# Patient Record
Sex: FEMALE | Race: WHITE | Employment: OTHER | URBAN - METROPOLITAN AREA
[De-identification: names, ages, dates, MRNs, and addresses within clinical notes are randomized per-mention and may not be internally consistent; named-entity substitution may affect disease eponyms.]

---

## 2017-03-13 ENCOUNTER — APPOINTMENT (OUTPATIENT)
Dept: CV DIAGNOSTICS | Facility: HOSPITAL | Age: 82
DRG: 291 | End: 2017-03-13
Attending: INTERNAL MEDICINE
Payer: MEDICARE

## 2017-03-13 ENCOUNTER — APPOINTMENT (OUTPATIENT)
Dept: CT IMAGING | Facility: HOSPITAL | Age: 82
DRG: 291 | End: 2017-03-13
Attending: EMERGENCY MEDICINE
Payer: MEDICARE

## 2017-03-13 ENCOUNTER — APPOINTMENT (OUTPATIENT)
Dept: ULTRASOUND IMAGING | Facility: HOSPITAL | Age: 82
DRG: 291 | End: 2017-03-13
Attending: HOSPITALIST
Payer: MEDICARE

## 2017-03-13 ENCOUNTER — APPOINTMENT (OUTPATIENT)
Dept: CV DIAGNOSTICS | Facility: HOSPITAL | Age: 82
DRG: 291 | End: 2017-03-13
Attending: HOSPITALIST
Payer: MEDICARE

## 2017-03-13 ENCOUNTER — HOSPITAL ENCOUNTER (INPATIENT)
Facility: HOSPITAL | Age: 82
LOS: 8 days | Discharge: NURSING, REHAB OR LONG-TERM CARE | DRG: 291 | End: 2017-03-21
Attending: EMERGENCY MEDICINE | Admitting: HOSPITALIST
Payer: MEDICARE

## 2017-03-13 ENCOUNTER — APPOINTMENT (OUTPATIENT)
Dept: GENERAL RADIOLOGY | Facility: HOSPITAL | Age: 82
DRG: 291 | End: 2017-03-13
Attending: EMERGENCY MEDICINE
Payer: MEDICARE

## 2017-03-13 DIAGNOSIS — I26.99 OTHER ACUTE PULMONARY EMBOLISM WITHOUT ACUTE COR PULMONALE (HCC): Primary | ICD-10-CM

## 2017-03-13 LAB
ALBUMIN SERPL BCP-MCNC: 2.8 G/DL (ref 3.5–4.8)
ALP SERPL-CCNC: 72 U/L (ref 32–100)
ALT SERPL-CCNC: 18 U/L (ref 14–54)
ANION GAP SERPL CALC-SCNC: 8 MMOL/L (ref 0–18)
APTT PPP: 34.9 SECONDS (ref 23.2–35.3)
APTT PPP: >240 SECONDS (ref 23.2–35.3)
AST SERPL-CCNC: 24 U/L (ref 15–41)
BASOPHILS # BLD: 0 K/UL (ref 0–0.2)
BASOPHILS NFR BLD: 0 %
BILIRUB DIRECT SERPL-MCNC: 0.2 MG/DL (ref 0–0.2)
BILIRUB SERPL-MCNC: 0.7 MG/DL (ref 0.3–1.2)
BILIRUB UR QL: NEGATIVE
BNP SERPL-MCNC: 767 PG/ML (ref 0–100)
BUN SERPL-MCNC: 11 MG/DL (ref 8–20)
BUN/CREAT SERPL: 18 (ref 10–20)
CALCIUM SERPL-MCNC: 8.5 MG/DL (ref 8.5–10.5)
CHLORIDE SERPL-SCNC: 100 MMOL/L (ref 95–110)
CO2 SERPL-SCNC: 25 MMOL/L (ref 22–32)
COLOR UR: YELLOW
CREAT SERPL-MCNC: 0.61 MG/DL (ref 0.5–1.5)
D DIMER PPP FEU-MCNC: 3.74 MCG/ML (ref ?–0.88)
EOSINOPHIL # BLD: 0.1 K/UL (ref 0–0.7)
EOSINOPHIL NFR BLD: 1 %
ERYTHROCYTE [DISTWIDTH] IN BLOOD BY AUTOMATED COUNT: 14.7 % (ref 11–15)
ERYTHROCYTE [DISTWIDTH] IN BLOOD BY AUTOMATED COUNT: 14.8 % (ref 11–15)
GLUCOSE SERPL-MCNC: 115 MG/DL (ref 70–99)
GLUCOSE UR-MCNC: NEGATIVE MG/DL
HCT VFR BLD AUTO: 41.4 % (ref 35–48)
HCT VFR BLD AUTO: 42 % (ref 35–48)
HGB BLD-MCNC: 13.9 G/DL (ref 12–16)
HGB BLD-MCNC: 13.9 G/DL (ref 12–16)
HGB UR QL STRIP.AUTO: NEGATIVE
LEUKOCYTE ESTERASE UR QL STRIP.AUTO: NEGATIVE
LYMPHOCYTES # BLD: 1.3 K/UL (ref 1–4)
LYMPHOCYTES NFR BLD: 11 %
MCH RBC QN AUTO: 28.7 PG (ref 27–32)
MCH RBC QN AUTO: 28.8 PG (ref 27–32)
MCHC RBC AUTO-ENTMCNC: 33.1 G/DL (ref 32–37)
MCHC RBC AUTO-ENTMCNC: 33.5 G/DL (ref 32–37)
MCV RBC AUTO: 85.9 FL (ref 80–100)
MCV RBC AUTO: 86.6 FL (ref 80–100)
MONOCYTES # BLD: 0.6 K/UL (ref 0–1)
MONOCYTES NFR BLD: 6 %
NEUTROPHILS # BLD AUTO: 9.4 K/UL (ref 1.8–7.7)
NEUTROPHILS NFR BLD: 83 %
NITRITE UR QL STRIP.AUTO: NEGATIVE
OSMOLALITY UR CALC.SUM OF ELEC: 276 MOSM/KG (ref 275–295)
PH UR: 5 [PH] (ref 5–8)
PLATELET # BLD AUTO: 313 K/UL (ref 140–400)
PLATELET # BLD AUTO: 333 K/UL (ref 140–400)
PMV BLD AUTO: 7.3 FL (ref 7.4–10.3)
PMV BLD AUTO: 7.7 FL (ref 7.4–10.3)
POTASSIUM SERPL-SCNC: 4.3 MMOL/L (ref 3.3–5.1)
PROT SERPL-MCNC: 7.5 G/DL (ref 5.9–8.4)
PROT UR-MCNC: 30 MG/DL
RBC # BLD AUTO: 4.82 M/UL (ref 3.7–5.4)
RBC # BLD AUTO: 4.85 M/UL (ref 3.7–5.4)
RBC #/AREA URNS AUTO: 15 /HPF
SODIUM SERPL-SCNC: 133 MMOL/L (ref 136–144)
SP GR UR STRIP: 1.02 (ref 1–1.03)
TROPONIN I SERPL-MCNC: 0.02 NG/ML (ref ?–0.03)
TROPONIN I SERPL-MCNC: 0.03 NG/ML (ref ?–0.03)
UROBILINOGEN UR STRIP-ACNC: <2
VIT C UR-MCNC: 40 MG/DL
WBC # BLD AUTO: 11.4 K/UL (ref 4–11)
WBC # BLD AUTO: 9.4 K/UL (ref 4–11)
WBC #/AREA URNS AUTO: 3 /HPF

## 2017-03-13 PROCEDURE — 93970 EXTREMITY STUDY: CPT

## 2017-03-13 PROCEDURE — 71010 XR CHEST AP PORTABLE  (CPT=71010): CPT | Performed by: RADIOLOGY

## 2017-03-13 PROCEDURE — 99223 1ST HOSP IP/OBS HIGH 75: CPT | Performed by: HOSPITALIST

## 2017-03-13 PROCEDURE — 99291 CRITICAL CARE FIRST HOUR: CPT | Performed by: INTERNAL MEDICINE

## 2017-03-13 PROCEDURE — 71260 CT THORAX DX C+: CPT

## 2017-03-13 PROCEDURE — 93306 TTE W/DOPPLER COMPLETE: CPT | Performed by: INTERNAL MEDICINE

## 2017-03-13 PROCEDURE — 93306 TTE W/DOPPLER COMPLETE: CPT

## 2017-03-13 RX ORDER — BISACODYL 10 MG
10 SUPPOSITORY, RECTAL RECTAL
Status: DISCONTINUED | OUTPATIENT
Start: 2017-03-13 | End: 2017-03-21

## 2017-03-13 RX ORDER — DOXEPIN HYDROCHLORIDE 50 MG/1
1 CAPSULE ORAL DAILY
Status: DISCONTINUED | OUTPATIENT
Start: 2017-03-13 | End: 2017-03-17

## 2017-03-13 RX ORDER — ASCORBIC ACID 500 MG
500 TABLET ORAL 2 TIMES DAILY
COMMUNITY

## 2017-03-13 RX ORDER — ECHINACEA PURPUREA EXTRACT 125 MG
1 TABLET ORAL
Status: DISCONTINUED | OUTPATIENT
Start: 2017-03-13 | End: 2017-03-21

## 2017-03-13 RX ORDER — ACETAMINOPHEN 325 MG/1
650 TABLET ORAL EVERY 6 HOURS PRN
COMMUNITY

## 2017-03-13 RX ORDER — HEPARIN SODIUM AND DEXTROSE 10000; 5 [USP'U]/100ML; G/100ML
INJECTION INTRAVENOUS CONTINUOUS
Status: DISCONTINUED | OUTPATIENT
Start: 2017-03-13 | End: 2017-03-18

## 2017-03-13 RX ORDER — HEPARIN SODIUM 1000 [USP'U]/ML
80 INJECTION, SOLUTION INTRAVENOUS; SUBCUTANEOUS ONCE
Status: DISCONTINUED | OUTPATIENT
Start: 2017-03-13 | End: 2017-03-13

## 2017-03-13 RX ORDER — FUROSEMIDE 10 MG/ML
40 INJECTION INTRAMUSCULAR; INTRAVENOUS ONCE
Status: COMPLETED | OUTPATIENT
Start: 2017-03-13 | End: 2017-03-13

## 2017-03-13 RX ORDER — ACETAMINOPHEN 325 MG/1
650 TABLET ORAL EVERY 6 HOURS PRN
Status: DISCONTINUED | OUTPATIENT
Start: 2017-03-13 | End: 2017-03-21

## 2017-03-13 RX ORDER — ECHINACEA PURPUREA EXTRACT 125 MG
1 TABLET ORAL AS NEEDED
COMMUNITY

## 2017-03-13 RX ORDER — DOCUSATE SODIUM 100 MG/1
100 CAPSULE, LIQUID FILLED ORAL 2 TIMES DAILY
Status: DISCONTINUED | OUTPATIENT
Start: 2017-03-13 | End: 2017-03-21

## 2017-03-13 RX ORDER — ONDANSETRON 2 MG/ML
4 INJECTION INTRAMUSCULAR; INTRAVENOUS EVERY 6 HOURS PRN
Status: DISCONTINUED | OUTPATIENT
Start: 2017-03-13 | End: 2017-03-21

## 2017-03-13 RX ORDER — ASCORBIC ACID 500 MG
500 TABLET ORAL 2 TIMES DAILY
Status: DISCONTINUED | OUTPATIENT
Start: 2017-03-13 | End: 2017-03-17

## 2017-03-13 RX ORDER — HEPARIN SODIUM 1000 [USP'U]/ML
80 INJECTION, SOLUTION INTRAVENOUS; SUBCUTANEOUS ONCE
Status: COMPLETED | OUTPATIENT
Start: 2017-03-13 | End: 2017-03-13

## 2017-03-13 RX ORDER — HEPARIN SODIUM AND DEXTROSE 10000; 5 [USP'U]/100ML; G/100ML
18 INJECTION INTRAVENOUS ONCE
Status: COMPLETED | OUTPATIENT
Start: 2017-03-13 | End: 2017-03-13

## 2017-03-13 RX ORDER — SODIUM PHOSPHATE, DIBASIC AND SODIUM PHOSPHATE, MONOBASIC 7; 19 G/133ML; G/133ML
1 ENEMA RECTAL ONCE AS NEEDED
Status: ACTIVE | OUTPATIENT
Start: 2017-03-13 | End: 2017-03-13

## 2017-03-13 RX ORDER — ACETAMINOPHEN 325 MG/1
650 TABLET ORAL EVERY 6 HOURS PRN
Status: DISCONTINUED | OUTPATIENT
Start: 2017-03-13 | End: 2017-03-13

## 2017-03-13 RX ORDER — AMOXICILLIN AND CLAVULANATE POTASSIUM 500; 125 MG/1; MG/1
500 TABLET, FILM COATED ORAL EVERY 12 HOURS SCHEDULED
Status: DISCONTINUED | OUTPATIENT
Start: 2017-03-13 | End: 2017-03-15

## 2017-03-13 RX ORDER — AMOXICILLIN AND CLAVULANATE POTASSIUM 500; 125 MG/1; MG/1
1 TABLET, FILM COATED ORAL 3 TIMES DAILY
Status: ON HOLD | COMMUNITY
End: 2017-03-20

## 2017-03-13 RX ORDER — POLYETHYLENE GLYCOL 3350 17 G/17G
17 POWDER, FOR SOLUTION ORAL DAILY PRN
Status: DISCONTINUED | OUTPATIENT
Start: 2017-03-13 | End: 2017-03-21

## 2017-03-13 NOTE — CONSULTS
[facility]    PATIENT'S NAME: Davila [facility]   ATTENDING PHYSICIAN: Danish Ricardo MD   CONSULTING PHYSICIAN: Amelie Mchugh.  Britton Renner MD   PATIENT ACCOUNT#:   [id number]    LOCATION:  [facility]  MEDICAL RECORD #:   [id number]       DATE OF BIRTH: process edema, small bilateral posterior layer pleural effusions. The patient was started on IV heparin. She has received IV Lasix and Pulmonary consultation.   It was reported that a large thrombus involving the right main pulmonary artery extending to th patient was noted to have edema which is stable. She also has a history of rhabdomyolysis. No cardiac history reported. No hypertension reported. No hyperlipidemia reported.       PHYSICAL EXAMINATION:    GENERAL:  Awake, alert, breathing comfortably on

## 2017-03-13 NOTE — ED PROVIDER NOTES
Patient Seen in: [facility] AND [facility] Emergency Department    History   Patient presents with:  Dyspnea ANAND SOB (respiratory)    Stated Complaint: hypoxia    HPI    80-year-old female with history of hypothyroidism, osteoporosis, and edema presents from her bilaterally. Cardiovascular: regular rate and rhythm  Gastrointestinal:  abdomen is soft and non tender, no masses, bowel sounds normal  Neurological: Speech normal.  Moving extremities equally ×4. Skin: warm and dry, no rashes.   Musculoskeletal: neck is CBC WITH DIFFERENTIAL WITH PLATELET.   Procedure                               Abnormality         Status                     ---------                               -----------         ------                     CBC W/ DIFFERENTIAL[[id number]]          Abno

## 2017-03-13 NOTE — H&P
[address] Patient Status:  Inpatient    1928 MRN [id number]   Location [facility] 1SW Attending Kimber Davis MD   Hosp Day # 0 PCP Reynaldo Brain     Date:  3/13/20 1 tablet by mouth 3 (three) times daily. Until 3/23/17   Vitamin C 500 MG Oral Tab Take 500 mg by mouth 2 (two) times daily. acetaminophen 325 MG Oral Tab Take 650 mg by mouth every 6 (six) hours as needed for Pain or Fever.    Saline Nasal Spray 0.65 % N cardiomegaly mild vascular congestion suggesting heart failure or fluid overload. 2. Patchy areas of increased density mostly in the left mid and lower lung could be superimposed pulmonary edema or pneumonia. 3. Possible small left effusion.          Ct Naina patient's hemodynamic deteriorate or significant hypoxemia and then risk versus benefit will offer thrombolytic therapy      40 minute critical care time  We will follow the patient closely        Buzz Wallis MD  3/13/2017

## 2017-03-13 NOTE — ED INITIAL ASSESSMENT (HPI)
Via medics from [address] [place] [address],[address] home---was found hypoxic this morning----80's on Room air. Patient with cough, denies pain or sob.

## 2017-03-14 ENCOUNTER — APPOINTMENT (OUTPATIENT)
Dept: GENERAL RADIOLOGY | Facility: HOSPITAL | Age: 82
DRG: 291 | End: 2017-03-14
Attending: INTERNAL MEDICINE
Payer: MEDICARE

## 2017-03-14 LAB
ANION GAP SERPL CALC-SCNC: 9 MMOL/L (ref 0–18)
APTT PPP: 139.7 SECONDS (ref 23.2–35.3)
APTT PPP: 204.6 SECONDS (ref 23.2–35.3)
BASOPHILS # BLD: 0 K/UL (ref 0–0.2)
BASOPHILS NFR BLD: 0 %
BUN SERPL-MCNC: 16 MG/DL (ref 8–20)
BUN/CREAT SERPL: 22.9 (ref 10–20)
CALCIUM SERPL-MCNC: 8.1 MG/DL (ref 8.5–10.5)
CHLORIDE SERPL-SCNC: 101 MMOL/L (ref 95–110)
CO2 SERPL-SCNC: 26 MMOL/L (ref 22–32)
CREAT SERPL-MCNC: 0.7 MG/DL (ref 0.5–1.5)
EOSINOPHIL # BLD: 0.2 K/UL (ref 0–0.7)
EOSINOPHIL NFR BLD: 2 %
ERYTHROCYTE [DISTWIDTH] IN BLOOD BY AUTOMATED COUNT: 14.8 % (ref 11–15)
GLUCOSE SERPL-MCNC: 98 MG/DL (ref 70–99)
HCT VFR BLD AUTO: 36.1 % (ref 35–48)
HGB BLD-MCNC: 12.1 G/DL (ref 12–16)
INR BLD: 1.4 (ref 0.9–1.2)
LYMPHOCYTES # BLD: 1.3 K/UL (ref 1–4)
LYMPHOCYTES NFR BLD: 14 %
MAGNESIUM SERPL-MCNC: 2.1 MG/DL (ref 1.8–2.5)
MCH RBC QN AUTO: 28.6 PG (ref 27–32)
MCHC RBC AUTO-ENTMCNC: 33.5 G/DL (ref 32–37)
MCV RBC AUTO: 85.6 FL (ref 80–100)
MONOCYTES # BLD: 0.7 K/UL (ref 0–1)
MONOCYTES NFR BLD: 7 %
MRSA DNA SPEC QL NAA+PROBE: NEGATIVE
NEUTROPHILS # BLD AUTO: 7.2 K/UL (ref 1.8–7.7)
NEUTROPHILS NFR BLD: 77 %
OSMOLALITY UR CALC.SUM OF ELEC: 283 MOSM/KG (ref 275–295)
PLATELET # BLD AUTO: 295 K/UL (ref 140–400)
PLATELET # BLD AUTO: 295 K/UL (ref 140–400)
PMV BLD AUTO: 7.5 FL (ref 7.4–10.3)
POTASSIUM SERPL-SCNC: 3.7 MMOL/L (ref 3.3–5.1)
PROTHROMBIN TIME: 17 SECONDS (ref 11.8–14.5)
RBC # BLD AUTO: 4.22 M/UL (ref 3.7–5.4)
SODIUM SERPL-SCNC: 136 MMOL/L (ref 136–144)
T4 FREE SERPL-MCNC: 0.81 NG/DL (ref 0.58–1.64)
TSH SERPL-ACNC: 11.84 UIU/ML (ref 0.34–5.6)
WBC # BLD AUTO: 9.3 K/UL (ref 4–11)

## 2017-03-14 PROCEDURE — 99233 SBSQ HOSP IP/OBS HIGH 50: CPT | Performed by: HOSPITALIST

## 2017-03-14 PROCEDURE — 71010 XR CHEST AP PORTABLE  (CPT=71010): CPT

## 2017-03-14 PROCEDURE — 99233 SBSQ HOSP IP/OBS HIGH 50: CPT | Performed by: INTERNAL MEDICINE

## 2017-03-14 RX ORDER — SODIUM CHLORIDE 9 MG/ML
INJECTION, SOLUTION INTRAVENOUS
Status: COMPLETED
Start: 2017-03-14 | End: 2017-03-14

## 2017-03-14 RX ORDER — POTASSIUM CHLORIDE 20 MEQ/1
40 TABLET, EXTENDED RELEASE ORAL ONCE
Status: COMPLETED | OUTPATIENT
Start: 2017-03-14 | End: 2017-03-14

## 2017-03-14 RX ORDER — SODIUM CHLORIDE 9 MG/ML
INJECTION, SOLUTION INTRAVENOUS ONCE
Status: COMPLETED | OUTPATIENT
Start: 2017-03-14 | End: 2017-03-14

## 2017-03-14 RX ORDER — WARFARIN SODIUM 3 MG/1
3 TABLET ORAL NIGHTLY
Status: DISCONTINUED | OUTPATIENT
Start: 2017-03-14 | End: 2017-03-16

## 2017-03-14 RX ORDER — MELATONIN
DAILY
Status: DISCONTINUED | OUTPATIENT
Start: 2017-03-14 | End: 2017-03-21

## 2017-03-14 RX ORDER — LEVOTHYROXINE SODIUM 0.03 MG/1
25 TABLET ORAL
Status: DISCONTINUED | OUTPATIENT
Start: 2017-03-14 | End: 2017-03-21

## 2017-03-14 NOTE — PLAN OF CARE
CARDIOVASCULAR - ADULT    • Maintains optimal cardiac output and hemodynamic stability Progressing    • Absence of cardiac arrhythmias or at baseline Progressing    BP low overnight, fluid bolus provided with improvement of SBP greater than 90.  Monitor NSR

## 2017-03-14 NOTE — PLAN OF CARE
Inpatient to Inpatient Transfer    Reason for Transfer:  Transfer to 228 (PCU)     SBAR Reviewed and Verbal Report:  Given to Cira Jeter RN    Patient's Family Notified of Transfer and Given Unit Phone Number/Visiting Hours:  Yes    Comments: Transfer to PCU

## 2017-03-14 NOTE — PLAN OF CARE
CARDIOVASCULAR - ADULT    • Maintains optimal cardiac output and hemodynamic stability Progressing    • Absence of cardiac arrhythmias or at baseline Progressing        GENITOURINARY - ADULT    • Absence of urinary retention Progressing        METABOLIC/FL

## 2017-03-14 NOTE — PROGRESS NOTES
[facility]  Cardiology Progress Note    Malcolm Manju Patient Status:  Inpatient    1928 MRN [id number]   Location [facility] 2W/SW Attending Kimber Davis MD   Hosp Day # 1 PCP [facility]       Assessment and Pl 03/13/17   0927  03/14/17   0358   NA  133*  136   K  4.3  3.7   CL  100  101   CO2  25  26   BUN  11  16   CREATSERUM  0.61  0.70   CA  8.5  8.1*   MG   --   2.1   GLU  115*  98       Recent Labs   Lab  03/13/17   0927   ALT  18   AST  24   ALB  2.8*

## 2017-03-14 NOTE — DISCHARGE PLANNING
SW following up on d/c planning for the patient. She was admitted from [facility] where she is a long term resident. Updates sent today and SW will arrange transfer back when she is stable for d/c.      Cortez Bautista LCSW  [id number]

## 2017-03-14 NOTE — SPIRITUAL CARE NOTE
Chp responded to request for prayer. Pt's brother Samantha Reyes) was at her side. They both confirmed that communion was offered earlier today. Chp introduced them to spiritual care. Pt and brother welcome regular pastoral visits.

## 2017-03-14 NOTE — PROGRESS NOTES
[facility]    Progress Note    Kyara Dang Patient Status:  Inpatient    1928 MRN [id number]   Location [facility] 2W/SW Attending Divina Morelos MD   Hosp Day # 1 PCP [facility]     Subjective:   S 03/14/2017   CREATSERUM 0.70 03/14/2017   BUN 16 03/14/2017    03/14/2017   K 3.7 03/14/2017    03/14/2017   CO2 26 03/14/2017   GLU 98 03/14/2017   CA 8.1* 03/14/2017   ALB 2.8* 03/13/2017   ALKPHO 72 03/13/2017   BILT 0.7 03/13/2017   TP 7.5

## 2017-03-14 NOTE — H&P
Cirilo Richard    PATIENT'S NAME: Desmond Candelario   ATTENDING PHYSICIAN: Neli Deshpande MD   PATIENT ACCOUNT#:   [de-identified]    LOCATION:  [facility]  MEDICAL RECORD #:   [id number]       YOB: 1928  ADMISSION DATE:       03 neck pain. No abdominal pain. No nausea or vomiting. She says that her bowel movements are regular.   She does have urinary incontinence, chronic lower extremity edema, generalized weakness with a fall about 1 year ago after which she ended up in the [facility] dilated. PA pressure 38 mmHg. Right ventricle moderately to severely dilated with decreased systolic function. Small pericardial effusion. ASSESSMENT AND PLAN:    1. Acute large PE in the right main pulmonary artery with right ventricular strain.

## 2017-03-15 LAB
ANION GAP SERPL CALC-SCNC: 6 MMOL/L (ref 0–18)
APTT PPP: 124.4 SECONDS (ref 23.2–35.3)
APTT PPP: 71.7 SECONDS (ref 23.2–35.3)
APTT PPP: 87 SECONDS (ref 23.2–35.3)
BUN SERPL-MCNC: 12 MG/DL (ref 8–20)
BUN/CREAT SERPL: 21.4 (ref 10–20)
CALCIUM SERPL-MCNC: 8.3 MG/DL (ref 8.5–10.5)
CHLORIDE SERPL-SCNC: 102 MMOL/L (ref 95–110)
CO2 SERPL-SCNC: 26 MMOL/L (ref 22–32)
CREAT SERPL-MCNC: 0.56 MG/DL (ref 0.5–1.5)
GLUCOSE SERPL-MCNC: 115 MG/DL (ref 70–99)
INR BLD: 1.2 (ref 0.9–1.2)
OSMOLALITY UR CALC.SUM OF ELEC: 279 MOSM/KG (ref 275–295)
PLATELET # BLD AUTO: 298 K/UL (ref 140–400)
POTASSIUM SERPL-SCNC: 4.3 MMOL/L (ref 3.3–5.1)
PROTHROMBIN TIME: 15.2 SECONDS (ref 11.8–14.5)
SODIUM SERPL-SCNC: 134 MMOL/L (ref 136–144)

## 2017-03-15 PROCEDURE — 99233 SBSQ HOSP IP/OBS HIGH 50: CPT | Performed by: HOSPITALIST

## 2017-03-15 PROCEDURE — 99233 SBSQ HOSP IP/OBS HIGH 50: CPT | Performed by: INTERNAL MEDICINE

## 2017-03-15 NOTE — PLAN OF CARE
CARDIOVASCULAR - ADULT    • Maintains optimal cardiac output and hemodynamic stability Progressing    • Absence of cardiac arrhythmias or at baseline Progressing          GENITOURINARY - ADULT    • Absence of urinary retention Progressing          METABOLI

## 2017-03-15 NOTE — PROGRESS NOTES
[facility]    Progress Note    Matt Ramirez Patient Status:  Inpatient    1928 MRN [id number]   Location [facility] 2W/SW Attending Chano Colin MD   Hosp Day # 2 PCP [facility]       Subjective:   Feeling gtt  Dispo pending    >35min spent, >50% spent counseling and coordinating care in the form of educating pt/family and d/w consultants and staff.

## 2017-03-15 NOTE — PLAN OF CARE
SKIN/TISSUE INTEGRITY - ADULT    • Incision(s), wounds(s) or drain site(s) healing without S/S of infection Not Progressing          CARDIOVASCULAR - ADULT    • Maintains optimal cardiac output and hemodynamic stability Progressing    • Absence of cardiac

## 2017-03-15 NOTE — PROGRESS NOTES
[facility]  Cardiology Progress Note    Camron Handyric Patient Status:  Inpatient    1928 MRN [id number]   Location [facility] 2W/SW Attending Marika Wall MD   Hosp Day # 2 PCP [facility]       Assessment and Plan:PE CA  8.5  8.1*  8.3*   MG   --   2.1   --    GLU  115*  98  115*       Recent Labs   Lab  03/13/17 0927   ALT  18   AST  24   ALB  2.8*       Recent Labs   Lab  03/13/17 0927 03/13/17   1743   TROP  0.03  0.02       Allergies:   No Known Allergies

## 2017-03-15 NOTE — PROGRESS NOTES
[facility]    Progress Note    Ute De Leon Patient Status:  Inpatient    1928 MRN [id number]   Location [facility] 2W/SW Attending Duc Lang MD   Hosp Day # 2 PCP [facility]     Subjective:   Subject AST 24 03/13/2017   ALT 18 03/13/2017   PTT 71.7* 03/14/2017   INR 1.2 03/15/2017   T4F 0.81 03/14/2017   TSH 11.84* 03/14/2017   DDIMER 3.74* 03/13/2017   MG 2.1 03/14/2017   TROP 0.02 03/13/2017       Us Venous Doppler Leg Bilat - Diag Img (cpt=[id number])

## 2017-03-15 NOTE — PHYSICAL THERAPY NOTE
PHYSICAL THERAPY EVALUATION - INPATIENT     Room Number: 228/228-A  Evaluation Date: 3/15/2017  Type of Evaluation: Initial  Physician Order: PT Eval and Treat    Presenting Problem: PE  Reason for Therapy: Mobility Dysfunction and Discharge Planning • Rhabdomyolysis    • Fall    • Osteoarthritis    • Disorder of thyroid    • Hyponatremia    • Edema        Past Surgical History  History reviewed. No pertinent past surgical history.     HOME SITUATION  Type of Home: Skilled nursing facility (ICQXVMIIO) 86.62%   Standardized Score (AM-PAC Scale): 28.58   CMS Modifier (G-Code): CM    FUNCTIONAL ABILITY STATUS  Gait Assessment   Gait Assistance: Not tested           Stoop/Curb Assistance: Not tested       Bed Mobility:max A x 2    Transfers:Not assessed

## 2017-03-15 NOTE — PROGRESS NOTES
[facility]    Progress Note    Katy Slider Patient Status:  Inpatient    1928 MRN [id number]   Location [facility] 2W/SW Attending Ailin Blankenship MD   Hosp Day # 2 PCP [facility]       Subjective:   Still s fever.  No clear evidence of infection.  Monitor closely.  Not able to get a clean specimen of urine because of incontinence, but the patient does not complain of any frequency or burning.  Changes on the CT of the chest seem more related either to chronic left mid and lower lung could be superimposed pulmonary edema or pneumonia. 3. Possible small left effusion. Ct Chest Pain/pe (iv Only) Em    3/13/2017  CONCLUSION:  1. Extensive right pulmonary emboli. 2. Diffuse pulmonary interstitial fibrosis.  3

## 2017-03-15 NOTE — OCCUPATIONAL THERAPY NOTE
OCCUPATIONAL THERAPY EVALUATION - INPATIENT     Room Number: 228/228-A  Evaluation Date: 3/15/2017  Type of Evaluation: Initial  Presenting Problem:  (multiple large PEs, CHF)    Physician Order: IP Consult to Occupational Therapy  Reason for Therapy: ADL/ Standard fall risk    PAIN ASSESSMENT  Rating:  (pt makes no indication of pain)          ACTIVITY TOLERANCE  Tolerates session fair with activity limited to EOB    COGNITION  Awake, follows simple commands with cues, encouragement          RANGE OF MOTION

## 2017-03-16 LAB
ANION GAP SERPL CALC-SCNC: 4 MMOL/L (ref 0–18)
APTT PPP: 74.6 SECONDS (ref 23.2–35.3)
BASOPHILS # BLD: 0 K/UL (ref 0–0.2)
BASOPHILS NFR BLD: 0 %
BUN SERPL-MCNC: 9 MG/DL (ref 8–20)
BUN/CREAT SERPL: 16.1 (ref 10–20)
CALCIUM SERPL-MCNC: 8.4 MG/DL (ref 8.5–10.5)
CHLORIDE SERPL-SCNC: 104 MMOL/L (ref 95–110)
CO2 SERPL-SCNC: 28 MMOL/L (ref 22–32)
CREAT SERPL-MCNC: 0.56 MG/DL (ref 0.5–1.5)
EOSINOPHIL # BLD: 0.1 K/UL (ref 0–0.7)
EOSINOPHIL NFR BLD: 2 %
ERYTHROCYTE [DISTWIDTH] IN BLOOD BY AUTOMATED COUNT: 14.9 % (ref 11–15)
ERYTHROCYTE [DISTWIDTH] IN BLOOD BY AUTOMATED COUNT: 14.9 % (ref 11–15)
GLUCOSE SERPL-MCNC: 103 MG/DL (ref 70–99)
HCT VFR BLD AUTO: 35 % (ref 35–48)
HCT VFR BLD AUTO: 35.4 % (ref 35–48)
HGB BLD-MCNC: 11.6 G/DL (ref 12–16)
HGB BLD-MCNC: 11.9 G/DL (ref 12–16)
INR BLD: 1.2 (ref 0.9–1.2)
LYMPHOCYTES # BLD: 1.5 K/UL (ref 1–4)
LYMPHOCYTES NFR BLD: 22 %
MCH RBC QN AUTO: 28.7 PG (ref 27–32)
MCH RBC QN AUTO: 29.1 PG (ref 27–32)
MCHC RBC AUTO-ENTMCNC: 33.2 G/DL (ref 32–37)
MCHC RBC AUTO-ENTMCNC: 33.7 G/DL (ref 32–37)
MCV RBC AUTO: 86.5 FL (ref 80–100)
MCV RBC AUTO: 86.5 FL (ref 80–100)
MONOCYTES # BLD: 0.4 K/UL (ref 0–1)
MONOCYTES NFR BLD: 7 %
NEUTROPHILS # BLD AUTO: 4.7 K/UL (ref 1.8–7.7)
NEUTROPHILS NFR BLD: 69 %
OSMOLALITY UR CALC.SUM OF ELEC: 281 MOSM/KG (ref 275–295)
PLATELET # BLD AUTO: 278 K/UL (ref 140–400)
PLATELET # BLD AUTO: 289 K/UL (ref 140–400)
PMV BLD AUTO: 7.5 FL (ref 7.4–10.3)
PMV BLD AUTO: 7.7 FL (ref 7.4–10.3)
POTASSIUM SERPL-SCNC: 4.4 MMOL/L (ref 3.3–5.1)
PROTHROMBIN TIME: 15.2 SECONDS (ref 11.8–14.5)
RBC # BLD AUTO: 4.05 M/UL (ref 3.7–5.4)
RBC # BLD AUTO: 4.09 M/UL (ref 3.7–5.4)
SODIUM SERPL-SCNC: 136 MMOL/L (ref 136–144)
WBC # BLD AUTO: 6.7 K/UL (ref 4–11)
WBC # BLD AUTO: 6.8 K/UL (ref 4–11)

## 2017-03-16 PROCEDURE — 99233 SBSQ HOSP IP/OBS HIGH 50: CPT | Performed by: INTERNAL MEDICINE

## 2017-03-16 PROCEDURE — 99232 SBSQ HOSP IP/OBS MODERATE 35: CPT | Performed by: HOSPITALIST

## 2017-03-16 RX ORDER — FUROSEMIDE 10 MG/ML
20 INJECTION INTRAMUSCULAR; INTRAVENOUS ONCE
Status: DISCONTINUED | OUTPATIENT
Start: 2017-03-16 | End: 2017-03-16

## 2017-03-16 RX ORDER — WARFARIN SODIUM 5 MG/1
5 TABLET ORAL NIGHTLY
Status: DISCONTINUED | OUTPATIENT
Start: 2017-03-16 | End: 2017-03-17

## 2017-03-16 RX ORDER — FUROSEMIDE 20 MG/1
20 TABLET ORAL DAILY
Status: DISCONTINUED | OUTPATIENT
Start: 2017-03-16 | End: 2017-03-21

## 2017-03-16 NOTE — PROGRESS NOTES
[facility]    Progress Note    Juanita Frey Patient Status:  Inpatient    1928 MRN [id number]   Location [facility] 2W/SW Attending Randi Leon MD   Hosp Day # 3 PCP [facility]       Subjective:   Feeling gtt  Dispo pending

## 2017-03-16 NOTE — PROGRESS NOTES
[facility]  Cardiology Progress Note    Camron Handyric Patient Status:  Inpatient    1928 MRN [id number]   Location [facility] 3W/SW Attending Marika Wall MD   Hosp Day # 3 PCP [facility]       Assessment and Plan: Aydee 104   CO2  26  26  28   BUN  16  12  9   CREATSERUM  0.70  0.56  0.56   CA  8.1*  8.3*  8.4*   MG  2.1   --    --    GLU  98  115*  103*       No results for input(s): ALT, AST, ALB, AMYLASE, LIPASE, LDH in the last 72 hours.     Invalid input(s): Jennifer Zhao

## 2017-03-16 NOTE — DISCHARGE PLANNING
3/16/17  Discharge planning   Updated medical records faxed to [facility].   Awaiting further orders to arrange return to [facility].  [facility]

## 2017-03-17 ENCOUNTER — APPOINTMENT (OUTPATIENT)
Dept: GENERAL RADIOLOGY | Facility: HOSPITAL | Age: 82
DRG: 291 | End: 2017-03-17
Attending: HOSPITALIST
Payer: MEDICARE

## 2017-03-17 LAB
ANION GAP SERPL CALC-SCNC: 7 MMOL/L (ref 0–18)
APTT PPP: 118.5 SECONDS (ref 23.2–35.3)
APTT PPP: 55.3 SECONDS (ref 23.2–35.3)
BASOPHILS # BLD: 0.1 K/UL (ref 0–0.2)
BASOPHILS NFR BLD: 1 %
BUN SERPL-MCNC: 8 MG/DL (ref 8–20)
BUN/CREAT SERPL: 11.9 (ref 10–20)
CALCIUM SERPL-MCNC: 8.7 MG/DL (ref 8.5–10.5)
CHLORIDE SERPL-SCNC: 100 MMOL/L (ref 95–110)
CO2 SERPL-SCNC: 29 MMOL/L (ref 22–32)
CREAT SERPL-MCNC: 0.67 MG/DL (ref 0.5–1.5)
EOSINOPHIL # BLD: 0.2 K/UL (ref 0–0.7)
EOSINOPHIL NFR BLD: 3 %
ERYTHROCYTE [DISTWIDTH] IN BLOOD BY AUTOMATED COUNT: 14.4 % (ref 11–15)
GLUCOSE SERPL-MCNC: 95 MG/DL (ref 70–99)
HCT VFR BLD AUTO: 35.9 % (ref 35–48)
HGB BLD-MCNC: 11.9 G/DL (ref 12–16)
INR BLD: 1.4 (ref 0.9–1.2)
LYMPHOCYTES # BLD: 1.7 K/UL (ref 1–4)
LYMPHOCYTES NFR BLD: 25 %
MAGNESIUM SERPL-MCNC: 2.4 MG/DL (ref 1.8–2.5)
MCH RBC QN AUTO: 28.5 PG (ref 27–32)
MCHC RBC AUTO-ENTMCNC: 33.2 G/DL (ref 32–37)
MCV RBC AUTO: 86 FL (ref 80–100)
MONOCYTES # BLD: 0.4 K/UL (ref 0–1)
MONOCYTES NFR BLD: 6 %
NEUTROPHILS # BLD AUTO: 4.4 K/UL (ref 1.8–7.7)
NEUTROPHILS NFR BLD: 65 %
OSMOLALITY UR CALC.SUM OF ELEC: 280 MOSM/KG (ref 275–295)
PLATELET # BLD AUTO: 267 K/UL (ref 140–400)
PMV BLD AUTO: 7.2 FL (ref 7.4–10.3)
POTASSIUM SERPL-SCNC: 4.4 MMOL/L (ref 3.3–5.1)
PROTHROMBIN TIME: 16.8 SECONDS (ref 11.8–14.5)
RBC # BLD AUTO: 4.18 M/UL (ref 3.7–5.4)
SODIUM SERPL-SCNC: 136 MMOL/L (ref 136–144)
WBC # BLD AUTO: 6.7 K/UL (ref 4–11)

## 2017-03-17 PROCEDURE — 71010 XR CHEST AP PORTABLE  (CPT=71010): CPT

## 2017-03-17 PROCEDURE — 99232 SBSQ HOSP IP/OBS MODERATE 35: CPT | Performed by: HOSPITALIST

## 2017-03-17 PROCEDURE — 99232 SBSQ HOSP IP/OBS MODERATE 35: CPT | Performed by: INTERNAL MEDICINE

## 2017-03-17 RX ORDER — WARFARIN SODIUM 6 MG/1
6 TABLET ORAL NIGHTLY
Status: DISCONTINUED | OUTPATIENT
Start: 2017-03-17 | End: 2017-03-18

## 2017-03-17 NOTE — PHYSICAL THERAPY NOTE
PHYSICAL THERAPY TREATMENT NOTE - INPATIENT    Room Number: 544/039-X       Presenting Problem: PE    Problem List  Principal Problem:    Other acute pulmonary embolism without acute cor pulmonale (HCC)  Active Problems:    Pulmonary embolism without acut lying on back to sitting on the side of the bed?: Unable   How much help from another person does the patient currently need. ..   -   Moving to and from a bed to a chair (including a wheelchair)?: Total   -   Need to walk in hospital room?: Total   -   Cli

## 2017-03-17 NOTE — PROGRESS NOTES
[facility]  Cardiology Progress Note    Katy Slider Patient Status:  Inpatient    1928 MRN [id number]   Location [facility] 3W/SW Attending Ailin Blankenship MD   Hosp Day # 4 PCP [facility]       Assessment and Plan:Pul 1.4*       Recent Labs   Lab  03/15/17   0359  03/16/17   0615  03/17/17   0642   NA  134*  136  136   K  4.3  4.4  4.4   CL  102  104  100   CO2  26  28  29   BUN  12  9  8   CREATSERUM  0.56  0.56  0.67   CA  8.3*  8.4*  8.7   MG   --    --   2.4   GLU

## 2017-03-17 NOTE — PROGRESS NOTES
Pulmonary/Critical Care Follow Up Note    HPI:   Katy Wesley is a 80year old female with Patient presents with:  Dyspnea ANAND SOB (respiratory)      PCP Magnus Harrell  Admission Attending Paul Preciado 15 Day #3    C/o cough  D Somnolent  A/O x 2  Lung crackles bases  cv reg  abd soft + bs  Ext + edema      LABS:      Lab Results  Component Value Date   WBC 6.7 03/16/2017   HGB 11.9 03/16/2017   HCT 35.4 03/16/2017    03/16/2017   CREATSERUM 0.56 03/16/2017   BUN 9 03/

## 2017-03-17 NOTE — PLAN OF CARE
CARDIOVASCULAR - ADULT    • Maintains optimal cardiac output and hemodynamic stability Not Progressing    • Absence of cardiac arrhythmias or at baseline Not Progressing        GENITOURINARY - ADULT    • Absence of urinary retention Not Progressing

## 2017-03-17 NOTE — PROGRESS NOTES
[facility]    Progress Note    Baldo Murray Patient Status:  Inpatient    1928 MRN [id number]   Location [facility] 3W/SW Attending Harsh Galvez MD   Hosp Day # 4 PCP [facility]     Subjective:   Subject 03/17/2017   INR 1.4* 03/17/2017   T4F 0.81 03/14/2017   TSH 11.84* 03/14/2017   DDIMER 3.74* 03/13/2017   MG 2.4 03/17/2017   TROP 0.02 03/13/2017                         Vinicius Hernández MD  3/17/2017

## 2017-03-17 NOTE — PROGRESS NOTES
[facility]    Progress Note    Sherri Roche Patient Status:  Inpatient    1928 MRN [id number]   Location [facility] 2W/SW Attending Bjorn Parker MD   Hosp Day # 4 PCP [facility]       Subjective:   No new dermatitis.     Hypothyroidism per chart review, not on supplement,  TSH elevated at 11.8 -->   -started low dose of Levothroid  -Repeat TSH in 4-6 weeks    Full code  Heparin gtt  Dispo pending

## 2017-03-18 LAB
ANION GAP SERPL CALC-SCNC: 8 MMOL/L (ref 0–18)
APTT PPP: 50.7 SECONDS (ref 23.2–35.3)
BASOPHILS # BLD: 0 K/UL (ref 0–0.2)
BASOPHILS NFR BLD: 0 %
BUN SERPL-MCNC: 14 MG/DL (ref 8–20)
BUN/CREAT SERPL: 22.2 (ref 10–20)
CALCIUM SERPL-MCNC: 8.7 MG/DL (ref 8.5–10.5)
CHLORIDE SERPL-SCNC: 96 MMOL/L (ref 95–110)
CO2 SERPL-SCNC: 29 MMOL/L (ref 22–32)
CREAT SERPL-MCNC: 0.63 MG/DL (ref 0.5–1.5)
EOSINOPHIL # BLD: 0.1 K/UL (ref 0–0.7)
EOSINOPHIL NFR BLD: 1 %
ERYTHROCYTE [DISTWIDTH] IN BLOOD BY AUTOMATED COUNT: 14.6 % (ref 11–15)
GLUCOSE SERPL-MCNC: 110 MG/DL (ref 70–99)
HCT VFR BLD AUTO: 39.1 % (ref 35–48)
HGB BLD-MCNC: 13.1 G/DL (ref 12–16)
INR BLD: 1.7 (ref 0.9–1.2)
LYMPHOCYTES # BLD: 1.4 K/UL (ref 1–4)
LYMPHOCYTES NFR BLD: 15 %
MAGNESIUM SERPL-MCNC: 2.3 MG/DL (ref 1.8–2.5)
MCH RBC QN AUTO: 28.5 PG (ref 27–32)
MCHC RBC AUTO-ENTMCNC: 33.4 G/DL (ref 32–37)
MCV RBC AUTO: 85.4 FL (ref 80–100)
MONOCYTES # BLD: 0.6 K/UL (ref 0–1)
MONOCYTES NFR BLD: 6 %
NEUTROPHILS # BLD AUTO: 7.3 K/UL (ref 1.8–7.7)
NEUTROPHILS NFR BLD: 78 %
OSMOLALITY UR CALC.SUM OF ELEC: 277 MOSM/KG (ref 275–295)
PLATELET # BLD AUTO: 278 K/UL (ref 140–400)
PMV BLD AUTO: 7.1 FL (ref 7.4–10.3)
POTASSIUM SERPL-SCNC: 4.4 MMOL/L (ref 3.3–5.1)
PROTHROMBIN TIME: 19.2 SECONDS (ref 11.8–14.5)
RBC # BLD AUTO: 4.58 M/UL (ref 3.7–5.4)
SODIUM SERPL-SCNC: 133 MMOL/L (ref 136–144)
WBC # BLD AUTO: 9.4 K/UL (ref 4–11)

## 2017-03-18 PROCEDURE — 99232 SBSQ HOSP IP/OBS MODERATE 35: CPT | Performed by: HOSPITALIST

## 2017-03-18 PROCEDURE — 99232 SBSQ HOSP IP/OBS MODERATE 35: CPT | Performed by: INTERNAL MEDICINE

## 2017-03-18 RX ORDER — HEPARIN SODIUM 1000 [USP'U]/ML
30 INJECTION, SOLUTION INTRAVENOUS; SUBCUTANEOUS ONCE
Status: COMPLETED | OUTPATIENT
Start: 2017-03-18 | End: 2017-03-18

## 2017-03-18 NOTE — PROGRESS NOTES
[facility]    Cardiology Progress Note    Ary Vitale Patient Status:  Inpatient    1928 MRN [id number]   Location [facility] 3W/SW Attending Citlaly Hinton MD   Hosp Day # 5 PCP Constantin Kulkarni * 03/18/2017   CA 8.7 03/18/2017   ALB 2.8* 03/13/2017   ALKPHO 72 03/13/2017   BILT 0.7 03/13/2017   TP 7.5 03/13/2017   AST 24 03/13/2017   ALT 18 03/13/2017   PTT 50.7* 03/18/2017   INR 1.7* 03/18/2017   T4F 0.81 03/14/2017   TSH 11.84* 03/14/2

## 2017-03-18 NOTE — PROGRESS NOTES
Pulmonary/Critical Care Follow Up Note    HPI:   Baldo Murray is a 80year old female with Patient presents with:  Dyspnea ANAND SOB (respiratory)      PCP Neena Dubin  Admission Attending Paul Covarrubias 15 Day #5    C/o cough  D 03/18/2017   CREATSERUM 0.63 03/18/2017   BUN 14 03/18/2017    03/18/2017   K 4.4 03/18/2017   CL 96 03/18/2017   CO2 29 03/18/2017    03/18/2017   CA 8.7 03/18/2017   PTT 50.7 03/18/2017   INR 1.7 03/18/2017   MG 2.3 03/18/2017           ASSE

## 2017-03-18 NOTE — PROGRESS NOTES
[facility]    Progress Note    Tali Rama Patient Status:  Inpatient    1928 MRN [id number]   Location [facility] 2W/SW Attending Lola Quevedo MD   Hosp Day # 5 PCP [facility]       Subjective:   No new in 4-6 weeks    Full code  Heparin gtt  Dispo to SNF after 24h overlap anticoagulation

## 2017-03-18 NOTE — PLAN OF CARE
CARDIOVASCULAR - ADULT    • Maintains optimal cardiac output and hemodynamic stability Progressing    • Absence of cardiac arrhythmias or at baseline Progressing          GENITOURINARY - ADULT    • Absence of urinary retention  [place] patent Progressing

## 2017-03-19 LAB
ANION GAP SERPL CALC-SCNC: 5 MMOL/L (ref 0–18)
BASOPHILS # BLD: 0 K/UL (ref 0–0.2)
BASOPHILS NFR BLD: 0 %
BUN SERPL-MCNC: 12 MG/DL (ref 8–20)
BUN/CREAT SERPL: 21.8 (ref 10–20)
CALCIUM SERPL-MCNC: 8.7 MG/DL (ref 8.5–10.5)
CHLORIDE SERPL-SCNC: 101 MMOL/L (ref 95–110)
CO2 SERPL-SCNC: 29 MMOL/L (ref 22–32)
CREAT SERPL-MCNC: 0.55 MG/DL (ref 0.5–1.5)
EOSINOPHIL # BLD: 0.1 K/UL (ref 0–0.7)
EOSINOPHIL NFR BLD: 2 %
ERYTHROCYTE [DISTWIDTH] IN BLOOD BY AUTOMATED COUNT: 15 % (ref 11–15)
ERYTHROCYTE [DISTWIDTH] IN BLOOD BY AUTOMATED COUNT: 15 % (ref 11–15)
GLUCOSE SERPL-MCNC: 101 MG/DL (ref 70–99)
HCT VFR BLD AUTO: 35.8 % (ref 35–48)
HCT VFR BLD AUTO: 35.8 % (ref 35–48)
HGB BLD-MCNC: 11.8 G/DL (ref 12–16)
HGB BLD-MCNC: 11.8 G/DL (ref 12–16)
INR BLD: 6.6 (ref 0.9–1.2)
LYMPHOCYTES # BLD: 1.3 K/UL (ref 1–4)
LYMPHOCYTES NFR BLD: 15 %
MAGNESIUM SERPL-MCNC: 2.3 MG/DL (ref 1.8–2.5)
MCH RBC QN AUTO: 28.3 PG (ref 27–32)
MCH RBC QN AUTO: 28.3 PG (ref 27–32)
MCHC RBC AUTO-ENTMCNC: 33 G/DL (ref 32–37)
MCHC RBC AUTO-ENTMCNC: 33 G/DL (ref 32–37)
MCV RBC AUTO: 85.6 FL (ref 80–100)
MCV RBC AUTO: 85.6 FL (ref 80–100)
MONOCYTES # BLD: 0.6 K/UL (ref 0–1)
MONOCYTES NFR BLD: 6 %
NEUTROPHILS # BLD AUTO: 6.6 K/UL (ref 1.8–7.7)
NEUTROPHILS NFR BLD: 77 %
OSMOLALITY UR CALC.SUM OF ELEC: 280 MOSM/KG (ref 275–295)
PLATELET # BLD AUTO: 274 K/UL (ref 140–400)
PLATELET # BLD AUTO: 274 K/UL (ref 140–400)
PMV BLD AUTO: 7.2 FL (ref 7.4–10.3)
PMV BLD AUTO: 7.2 FL (ref 7.4–10.3)
POTASSIUM SERPL-SCNC: 4.4 MMOL/L (ref 3.3–5.1)
PROTHROMBIN TIME: 58.2 SECONDS (ref 11.8–14.5)
RBC # BLD AUTO: 4.18 M/UL (ref 3.7–5.4)
RBC # BLD AUTO: 4.18 M/UL (ref 3.7–5.4)
SODIUM SERPL-SCNC: 135 MMOL/L (ref 136–144)
WBC # BLD AUTO: 8.6 K/UL (ref 4–11)
WBC # BLD AUTO: 8.6 K/UL (ref 4–11)

## 2017-03-19 PROCEDURE — 99232 SBSQ HOSP IP/OBS MODERATE 35: CPT | Performed by: INTERNAL MEDICINE

## 2017-03-19 PROCEDURE — 99232 SBSQ HOSP IP/OBS MODERATE 35: CPT | Performed by: HOSPITALIST

## 2017-03-19 NOTE — PROGRESS NOTES
[facility]    Progress Note    Pal[place]to [place] Fly Patient Status:  Inpatient    1928 MRN [id number]   Location [facility] 2W/SW Attending Antonio Iniguez MD   Hosp Day # 6 PCP [facility]       Subjective:   No new code  Heparin gtt  Dispo to SNF tomorrow    Discussed with niece at the bedside. All questions were answered.

## 2017-03-19 NOTE — PROGRESS NOTES
[facility]    Cardiology Progress Note    Richardlaya Bacadain Patient Status:  Inpatient    1928 MRN [id number]   Location [facility] 3W/SW Attending Chau Weller MD   Hosp Day # 6 PCP Elio Galvez 03/19/2017    03/19/2017    03/19/2017   CREATSERUM 0.55 03/19/2017   BUN 12 03/19/2017   * 03/19/2017   K 4.4 03/19/2017    03/19/2017   CO2 29 03/19/2017   * 03/19/2017   CA 8.7 03/19/2017   ALB 2.8* 03/13/2017   ALKPHO 7

## 2017-03-19 NOTE — PLAN OF CARE
CARDIOVASCULAR - ADULT    • Maintains optimal cardiac output and hemodynamic stability Progressing        Patient Centered Care    • Patient preferences are identified and integrated in the patient's plan of care [phone]    •

## 2017-03-20 LAB
ANION GAP SERPL CALC-SCNC: 9 MMOL/L (ref 0–18)
BASOPHILS # BLD: 0.1 K/UL (ref 0–0.2)
BASOPHILS NFR BLD: 1 %
BUN SERPL-MCNC: 22 MG/DL (ref 8–20)
BUN/CREAT SERPL: 36.7 (ref 10–20)
CALCIUM SERPL-MCNC: 8.7 MG/DL (ref 8.5–10.5)
CHLORIDE SERPL-SCNC: 99 MMOL/L (ref 95–110)
CO2 SERPL-SCNC: 27 MMOL/L (ref 22–32)
CREAT SERPL-MCNC: 0.6 MG/DL (ref 0.5–1.5)
EOSINOPHIL # BLD: 0.2 K/UL (ref 0–0.7)
EOSINOPHIL NFR BLD: 3 %
ERYTHROCYTE [DISTWIDTH] IN BLOOD BY AUTOMATED COUNT: 15.1 % (ref 11–15)
GLUCOSE SERPL-MCNC: 141 MG/DL (ref 70–99)
HCT VFR BLD AUTO: 33.8 % (ref 35–48)
HGB BLD-MCNC: 11.2 G/DL (ref 12–16)
INR BLD: 3.6 (ref 0.9–1.2)
LYMPHOCYTES # BLD: 1 K/UL (ref 1–4)
LYMPHOCYTES NFR BLD: 15 %
MCH RBC QN AUTO: 28.3 PG (ref 27–32)
MCHC RBC AUTO-ENTMCNC: 33.1 G/DL (ref 32–37)
MCV RBC AUTO: 85.5 FL (ref 80–100)
MONOCYTES # BLD: 0.6 K/UL (ref 0–1)
MONOCYTES NFR BLD: 8 %
NEUTROPHILS # BLD AUTO: 5.1 K/UL (ref 1.8–7.7)
NEUTROPHILS NFR BLD: 73 %
OSMOLALITY UR CALC.SUM OF ELEC: 286 MOSM/KG (ref 275–295)
PLATELET # BLD AUTO: 283 K/UL (ref 140–400)
PMV BLD AUTO: 7.3 FL (ref 7.4–10.3)
POTASSIUM SERPL-SCNC: 4.1 MMOL/L (ref 3.3–5.1)
PROTHROMBIN TIME: 36 SECONDS (ref 11.8–14.5)
RBC # BLD AUTO: 3.96 M/UL (ref 3.7–5.4)
SODIUM SERPL-SCNC: 135 MMOL/L (ref 136–144)
WBC # BLD AUTO: 7 K/UL (ref 4–11)

## 2017-03-20 PROCEDURE — 99232 SBSQ HOSP IP/OBS MODERATE 35: CPT | Performed by: INTERNAL MEDICINE

## 2017-03-20 PROCEDURE — 99233 SBSQ HOSP IP/OBS HIGH 50: CPT | Performed by: HOSPITALIST

## 2017-03-20 PROCEDURE — 99223 1ST HOSP IP/OBS HIGH 75: CPT | Performed by: INTERNAL MEDICINE

## 2017-03-20 RX ORDER — PHYTONADIONE 5 MG/1
5 TABLET ORAL ONCE
Status: COMPLETED | OUTPATIENT
Start: 2017-03-20 | End: 2017-03-20

## 2017-03-20 RX ORDER — FUROSEMIDE 20 MG/1
20 TABLET ORAL DAILY
Refills: 0 | Status: SHIPPED | COMMUNITY
Start: 2017-03-20 | End: 2017-03-21

## 2017-03-20 RX ORDER — LEVOTHYROXINE SODIUM 0.03 MG/1
25 TABLET ORAL
Refills: 0 | Status: SHIPPED | COMMUNITY
Start: 2017-03-20

## 2017-03-20 NOTE — PROGRESS NOTES
[facility]    Progress Note    Charisse Harrisonsin Patient Status:  Inpatient    1928 MRN [id number]   Location [facility] 2W/SW Attending Yadira Salas MD   Hosp Day # 7 PCP [facility]       Subjective:   No new respiratory failure due to the above.  O2 protocol.     Chronic lower extremity edema with venous stasis changes dermatitis.      Hypothyroidism per chart review, not on supplement,  TSH elevated at 11.8 -->   -started low dose of Levothroid  -Repeat TSH in

## 2017-03-20 NOTE — SPIRITUAL CARE NOTE
Chp followed-up with pt and family. Pt was resting and family (brother-Simone) said they are doing fine at this time. Chp will continue to check in and offer pastoral support from time to time.

## 2017-03-20 NOTE — PROGRESS NOTES
Pulmonary/Critical Care Follow Up Note    HPI:   Libby Coffey is a 80year old female with Patient presents with:  Dyspnea ANAND SOB (respiratory)      PCP Lynroger Setting  Admission Attending Paul Schaeffer 15 Day #6    lori cough HCT 35.8 03/19/2017   HCT 35.8 03/19/2017    03/19/2017    03/19/2017   CREATSERUM 0.55 03/19/2017   BUN 12 03/19/2017    03/19/2017   K 4.4 03/19/2017    03/19/2017   CO2 29 03/19/2017    03/19/2017   CA 8.7 03/19/2017

## 2017-03-20 NOTE — PROGRESS NOTES
[facility]  Cardiology Progress Note    Radha Pérez Patient Status:  Inpatient    1928 MRN [id number]   Location [facility] 3W/SW Attending Marquis Gino MD   Hosp Day # 7 PCP [facility]       Assessment and Plan:Pul [id number]  03/19/17   0544  03/19/17   0714   WBC  9.4  8.6  8.6   --    HGB  13.1  11.8*  11.8*   --    MCV  85.4  85.6  85.6   --    PLT  278  274  274   --    INR  1.7*   --   6.6*       Recent Labs   Lab  03/18/17   0728  03/19/17   0544   NA  133*  135*

## 2017-03-20 NOTE — CONSULTS
[facility]    PATIENT'S NAME: Vijaya Jose   ATTENDING PHYSICIAN: Ari Huitron. Conner Slade MD   CONSULTING PHYSICIAN: Horacio Fairchild.  MD Ladarius   PATIENT ACCOUNT#:   [id number]    LOCATION:  [address] [place] [address] #:   [id number]       DATE OF BIRTH: hypertension. MEDICATIONS:  Rivaroxaban 15 mg twice daily, levothyroxine, furosemide, docusate. ALLERGIES:  No known drug allergies. SOCIAL HISTORY:  The patient is a nursing home resident. She denies any alcohol, tobacco, or illicit drug use.   Maria Luz Wallis give her 1 dose of vitamin K and it is otherwise okay to continue rivaroxaban 15 mg twice daily for 3 weeks and then 20 mg daily. The patient does not have consistent dietary intake which could make dosing of warfarin difficult long-term.   She is predomin

## 2017-03-20 NOTE — DISCHARGE PLANNING
3/20/17 CM Discharge planning   Updated medical records faxed to [facility]. Spoke with Tre alvarenga at [facility] bed available when medically stable. CM will continue to follow.   Dania Alfonso  X [phone]

## 2017-03-20 NOTE — PROGRESS NOTES
[facility]    Progress Note    Kasie Woodgate Patient Status:  Inpatient    1928 MRN [id number]   Location [facility] 3W/SW Attending John Torres MD   Hosp Day # 7 PCP [facility]     Subjective:   Subject ALT 18 03/13/2017   PTT 50.7* 03/18/2017   INR 3.6* 03/20/2017   T4F 0.81 03/14/2017   TSH 11.84* 03/14/2017   DDIMER 3.74* 03/13/2017   MG 2.3 03/19/2017   TROP 0.02 03/13/2017                         Vinicius Ca MD  3/20/2017

## 2017-03-21 ENCOUNTER — PRIOR ORIGINAL RECORDS (OUTPATIENT)
Dept: OTHER | Age: 82
End: 2017-03-21

## 2017-03-21 VITALS
OXYGEN SATURATION: 94 % | RESPIRATION RATE: 18 BRPM | HEART RATE: 85 BPM | BODY MASS INDEX: 26.98 KG/M2 | SYSTOLIC BLOOD PRESSURE: 111 MMHG | DIASTOLIC BLOOD PRESSURE: 51 MMHG | WEIGHT: 142.88 LBS | TEMPERATURE: 98 F | HEIGHT: 61 IN

## 2017-03-21 LAB
ANION GAP SERPL CALC-SCNC: 6 MMOL/L (ref 0–18)
BASOPHILS # BLD: 0 K/UL (ref 0–0.2)
BASOPHILS NFR BLD: 1 %
BUN SERPL-MCNC: 26 MG/DL (ref 8–20)
BUN/CREAT SERPL: 48.1 (ref 10–20)
CALCIUM SERPL-MCNC: 8.6 MG/DL (ref 8.5–10.5)
CHLORIDE SERPL-SCNC: 101 MMOL/L (ref 95–110)
CO2 SERPL-SCNC: 30 MMOL/L (ref 22–32)
CREAT SERPL-MCNC: 0.54 MG/DL (ref 0.5–1.5)
EOSINOPHIL # BLD: 0.3 K/UL (ref 0–0.7)
EOSINOPHIL NFR BLD: 4 %
ERYTHROCYTE [DISTWIDTH] IN BLOOD BY AUTOMATED COUNT: 15.1 % (ref 11–15)
GLUCOSE SERPL-MCNC: 105 MG/DL (ref 70–99)
HCT VFR BLD AUTO: 33.2 % (ref 35–48)
HGB BLD-MCNC: 11.2 G/DL (ref 12–16)
INR BLD: 2.3 (ref 0.9–1.2)
LYMPHOCYTES # BLD: 1.4 K/UL (ref 1–4)
LYMPHOCYTES NFR BLD: 19 %
MAGNESIUM SERPL-MCNC: 2.3 MG/DL (ref 1.8–2.5)
MCH RBC QN AUTO: 28.9 PG (ref 27–32)
MCHC RBC AUTO-ENTMCNC: 33.6 G/DL (ref 32–37)
MCV RBC AUTO: 86 FL (ref 80–100)
MONOCYTES # BLD: 0.7 K/UL (ref 0–1)
MONOCYTES NFR BLD: 10 %
NEUTROPHILS # BLD AUTO: 5 K/UL (ref 1.8–7.7)
NEUTROPHILS NFR BLD: 66 %
OSMOLALITY UR CALC.SUM OF ELEC: 289 MOSM/KG (ref 275–295)
PLATELET # BLD AUTO: 305 K/UL (ref 140–400)
PMV BLD AUTO: 7.5 FL (ref 7.4–10.3)
POTASSIUM SERPL-SCNC: 4.3 MMOL/L (ref 3.3–5.1)
PROTHROMBIN TIME: 24.9 SECONDS (ref 11.8–14.5)
RBC # BLD AUTO: 3.86 M/UL (ref 3.7–5.4)
SODIUM SERPL-SCNC: 137 MMOL/L (ref 136–144)
WBC # BLD AUTO: 7.5 K/UL (ref 4–11)

## 2017-03-21 PROCEDURE — 99239 HOSP IP/OBS DSCHRG MGMT >30: CPT | Performed by: HOSPITALIST

## 2017-03-21 NOTE — DISCHARGE PLANNING
3/21/17 CM Discharge planning / MDO transfer to [facility]   Spoke with Tre alvarenga at [facility], bed available today at 11:00 am, RN report [phone], transport arranged via [facility], pt [facility] on [facility] [address]  X [phone]

## 2017-03-21 NOTE — PROGRESS NOTES
[facility]  Cardiology Progress Note    Nicholestuartmelvin Rexjoyce Patient Status:  Inpatient    1928 MRN [id number]   Location [facility] 3W/SW Attending Mac Pinon MD   Hosp Day # 8 PCP [facility]       Assessment and Plan: Pu rhythm. S1, S2 normal.Lungs: Clear    Abdomen: SoftExtremities: Without  edema. Peripheral pulses intact.   Neurologic: Alert Skin: Warm     Laboratory/Data:    Labs:         Recent Labs   Lab  03/19/17   0544  03/19/17   0714  03/20/17   1136  03/21/17

## 2017-03-21 NOTE — DISCHARGE SUMMARY
[facility] HOSPITALIST  DISCHARGE SUMMARY     Tali Ontiveros Patient Status:  Inpatient    1928 MRN [id number]   Location [facility] 3W/SW Attending No att. providers found   [facility] Day # 8 PCP ARAM WADE     DATE OF ADMISSION: 3 to anticoagulation were discussed with the family at length. He agreed to Xarelto for long-term treatment. Her respiratory status slowly improved during her hospitalization.   There is a small component of heart failure which was significantly improved by Commonly known as:  Kevan Montoya   Notes to Patient: To start 04/10/17 (after 19 days completion of 15mg BID)        Take 1 tablet (20 mg total) by mouth daily with food.     Quantity:  30 tablet   Refills:  0         CONTINUE taking these medications       I a visit in 2 weeks      The above plan and follow-up instructions were reviewed with the patient and they verbalized understanding and agreement. They understand to return to the emergency room for any concerning signs or symptoms.   Greater than 30 minute

## 2017-03-21 NOTE — TRANSITION NOTE
PATIENT IS BEING DC TO [facility] VIA AMBULANCE. FULL REPORT GIVEN TO Claire Swain RN [phone]. BROTHER ABEL WAS NOTIFIED ABOUT TRANSFER.  Virginia Wright

## 2017-03-22 ENCOUNTER — TELEPHONE (OUTPATIENT)
Dept: CARDIOLOGY UNIT | Facility: HOSPITAL | Age: 82
End: 2017-03-22

## 2017-03-24 ENCOUNTER — OFFICE VISIT (OUTPATIENT)
Dept: CARDIOLOGY CLINIC | Facility: HOSPITAL | Age: 82
End: 2017-03-24
Attending: INTERNAL MEDICINE
Payer: MEDICARE

## 2017-03-24 VITALS — SYSTOLIC BLOOD PRESSURE: 117 MMHG | OXYGEN SATURATION: 95 % | DIASTOLIC BLOOD PRESSURE: 70 MMHG | HEART RATE: 87 BPM

## 2017-03-24 DIAGNOSIS — I50.9 HEART FAILURE, UNSPECIFIED (HCC): Primary | ICD-10-CM

## 2017-03-24 PROCEDURE — 99214 OFFICE O/P EST MOD 30 MIN: CPT | Performed by: CLINICAL NURSE SPECIALIST

## 2017-03-24 PROCEDURE — 99211 OFF/OP EST MAY X REQ PHY/QHP: CPT | Performed by: CLINICAL NURSE SPECIALIST

## 2017-03-24 NOTE — PATIENT INSTRUCTIONS
Continue current medications    Please track daily weights. Call with weight gain of 3 lbs overnight or concerning symptoms.    [phone]    32-64 oz fluid restriction    Less than 2000 mg sodium/salt diet    Schedule hospital follow up appointment with

## 2017-03-24 NOTE — PROGRESS NOTES
[address] Patient Status:  Outpatient    1928 MRN [id number]   Location [facility]  Dr Alan Pereira is a 80year old female who presents to i 50.7* 03/18/2017 07:28 AM   INR 2.3* 03/21/2017 05:21 AM   PTP 24.9* 03/21/2017 05:21 AM   T4F 0.81 03/14/2017 12:46 PM   TSH 11.84* 03/14/2017 12:46 PM   DDIMER 3.74* 03/13/2017 09:27 AM   MG 2.3 03/21/2017 05:21 AM   TROP 0.02 03/13/2017 05:43 PM       C abdominal swelling/fullness. Essentially wheelchair bound - unable to assess SLATER. Denies orthopnea. Volume status stable. Vss. Continue present management. Plan:  Continue current medications    Please track daily weights.  Call with weight gain of 3 l

## 2017-04-04 LAB
BUN: 26 MG/DL
CALCIUM: 8.6 MG/DL
CHLORIDE: 101 MEQ/L
CREATININE, SERUM: 0.54 MG/DL
GLUCOSE: 105 MG/DL
HEMATOCRIT: 33.2 %
HEMOGLOBIN: 11.2 G/DL
MAGNESIUM: 2.3 MG/DL
PLATELETS: 305 K/UL
POTASSIUM, SERUM: 4.3 MEQ/L
RED BLOOD COUNT: 3.86 X 10-6/U
SODIUM: 137 MEQ/L
WHITE BLOOD COUNT: 7.5 X 10-3/U

## 2017-04-11 ENCOUNTER — PRIOR ORIGINAL RECORDS (OUTPATIENT)
Dept: OTHER | Age: 82
End: 2017-04-11

## 2017-05-04 ENCOUNTER — MYAURORA ACCOUNT LINK (OUTPATIENT)
Dept: OTHER | Age: 82
End: 2017-05-04

## 2017-05-04 ENCOUNTER — PRIOR ORIGINAL RECORDS (OUTPATIENT)
Dept: OTHER | Age: 82
End: 2017-05-04

## 2017-05-17 ENCOUNTER — OFFICE VISIT (OUTPATIENT)
Dept: PULMONOLOGY | Facility: CLINIC | Age: 82
End: 2017-05-17

## 2017-05-17 VITALS
HEART RATE: 86 BPM | OXYGEN SATURATION: 97 % | SYSTOLIC BLOOD PRESSURE: 111 MMHG | RESPIRATION RATE: 16 BRPM | BODY MASS INDEX: 28 KG/M2 | WEIGHT: 149.63 LBS | DIASTOLIC BLOOD PRESSURE: 75 MMHG

## 2017-05-17 DIAGNOSIS — J84.9 ILD (INTERSTITIAL LUNG DISEASE) (HCC): ICD-10-CM

## 2017-05-17 DIAGNOSIS — I26.09 OTHER PULMONARY EMBOLISM WITH ACUTE COR PULMONALE (HCC): Primary | ICD-10-CM

## 2017-05-17 DIAGNOSIS — F03.90 DEMENTIA WITHOUT BEHAVIORAL DISTURBANCE, UNSPECIFIED DEMENTIA TYPE (HCC): ICD-10-CM

## 2017-05-17 DIAGNOSIS — Z71.89 COUNSELING REGARDING END OF LIFE DECISION MAKING: ICD-10-CM

## 2017-05-17 DIAGNOSIS — I50.32 CHF (CONGESTIVE HEART FAILURE), NYHA CLASS I, CHRONIC, DIASTOLIC (HCC): ICD-10-CM

## 2017-05-17 PROBLEM — I26.99 PULMONARY EMBOLISM WITHOUT ACUTE COR PULMONALE (HCC): Status: RESOLVED | Noted: 2017-03-13 | Resolved: 2017-05-17

## 2017-05-17 PROBLEM — I26.99 OTHER ACUTE PULMONARY EMBOLISM WITHOUT ACUTE COR PULMONALE (HCC): Status: RESOLVED | Noted: 2017-03-13 | Resolved: 2017-05-17

## 2017-05-17 PROCEDURE — G0463 HOSPITAL OUTPT CLINIC VISIT: HCPCS | Performed by: INTERNAL MEDICINE

## 2017-05-17 PROCEDURE — 99215 OFFICE O/P EST HI 40 MIN: CPT | Performed by: INTERNAL MEDICINE

## 2017-05-17 RX ORDER — FUROSEMIDE 20 MG/1
20 TABLET ORAL 2 TIMES DAILY
COMMUNITY

## 2017-05-17 NOTE — PROGRESS NOTES
Pulmonary Consult Note    HPI:   Sharmin Kaufman is a 80year old female with Patient presents with:  Caleb Posada    Pt is a 79 yo with recent Dx of PE and abnl Ct chest who now comes to clinic for f/u  She is wheelchair bound Family History:  Family History   Problem Relation Age of Onset   • Family history unknown:  Yes            PHYSICAL EXAM:   Resp 16  Ht   Wt 149 lb 9.6 oz (67.858 kg)  SpO2   NAD  A&Ox3  Minimally verbal  Head AT/NC  Anicteric  Lung b/l crackles

## 2019-01-25 ENCOUNTER — TELEPHONE (OUTPATIENT)
Dept: CARDIOLOGY | Age: 84
End: 2019-01-25

## 2019-03-01 VITALS
RESPIRATION RATE: 18 BRPM | WEIGHT: 170 LBS | DIASTOLIC BLOOD PRESSURE: 72 MMHG | HEIGHT: 61 IN | BODY MASS INDEX: 32.1 KG/M2 | HEART RATE: 90 BPM | SYSTOLIC BLOOD PRESSURE: 118 MMHG

## 2019-05-03 ENCOUNTER — APPOINTMENT (OUTPATIENT)
Dept: CARDIOLOGY | Age: 84
End: 2019-05-03

## (undated) NOTE — IP AVS SNAPSHOT
[address]  [address] [facility] [phone]                Discharge Summary   3/13/2017    Biju Crockett           Admission Information        Provider Department    3/13/2017 Katt Werner MD [facility] 3w Take 1 tablet (20 mg total) by mouth daily with food.     Jonh Sotelo taking these medications        Instructions Authorizing Provider    Morning Afternoon Evening As Needed    acetaminophen 325 MG Tabs   Commonly known Follow up with Hyun Cohen MD. Schedule an appointment as soon as possible for a visit in 2 weeks.     Specialties:  CARDIOLOGY, Internal Medicine    Contact information:    [place] [address] [phone]        Future Ally 0.1 (03/19/17)  0.0      Metabolic Lab Results  (Last result in the past 90 days)    HgbA1C Glucose BUN Creatinine Calcium Alkaline Phosph AST    -- (03/21/17)  105 (H) (03/21/17)  26 (H) (03/21/17)  0.54 (03/21/17)  8.6 (03/13/17)  72 (03/13/17)  24 your Zip Code and Date of Birth to complete the sign-up process. If you do not sign up before the expiration date, you must request a new code.     Your unique [employer] Access Code: [email or web address]  Expires: 5/20/2017  9:57 AM    If you have questions, you can c What to report to your healthcare provider: Head or mouth pain, coating on mouth/tongue, shortness of breath, sensation of heart racing, rash, or itching           Endocrine Medications     Levothyroxine Sodium 25 MCG Oral Tab         Use: Regulate metabol

## (undated) NOTE — MR AVS SNAPSHOT
Tal Larson [address] [place] [address] [phone]               Thank you for choosing us for your health care visit with MAX Hunt.   We are glad to serve you and happy to pr Take 1 tablet (15 mg total) by mouth 2 (two) times daily with meals. Commonly known as:  XARELTO           * Rivaroxaban 20 MG Tabs   Take 1 tablet (20 mg total) by mouth daily with food.    Commonly known as:  XARELTO           Saline Nasal Spray 0.65 % including white bread, rice and pasta   Eat plenty of protein, keep the fat content low Sugars:  sodas and sports drinks, candies and desserts   Eat plenty of low-fat dairy products High fat meats and dairy   Choose whole grain products Foods high in sodiu

## (undated) NOTE — MR AVS SNAPSHOT
[address]  [phone]               Thank you for choosing us for your health care visit with Sada Hunter MD.  We are glad to serve you and happy to provide you with this summary of your visit.   P Commonly known as:  VITAMIN C                   MyChart     Sign up for [employer], your secure online medical record. [employer] will allow you to access patient instructions from your recent visit,  view other health information, and more.  To sign up or find

## (undated) NOTE — IP AVS SNAPSHOT
Patient Demographics     Address Phone    [address] [phone] [facility])  [phone] (Mobile) *Preferred*      Emergency Contact(s)     Name Relation Home Work Andrey Campoverde Brother [phone]        Allergies Last time this was given:  15 mg on 3/21/2017  8:30 AM   Commonly known as:  Harle Alpha   Next dose due:  3/21/17 with dinner   Notes to Patient:  Total of 19 days        Take 1 tablet (15 mg total) by mouth 2 (two) times daily with meals.    Stop taking on: [id number] rivaroxaban (XARELTO) tab 15 mg 03/20/17 1018 Given      [id number] rivaroxaban (XARELTO) tab 15 mg 03/20/17 1707 Given      [id number] rivaroxaban (XARELTO) tab 15 mg 03/21/17 0830 Given                    Recent Vital Signs       Most Recent Tiffanie Glucose 105 70-99 mg/dL [facility]   Sodium 137 136-144 mmol/L  [facility]   Potassium 4.3 3.3-5.1 mmol/L  [facility]   Chloride 101  mmol/L  [facility]   CO2 30 22-32 mmol/L  [facility]   BUN 26 8-20 mg/dL [facility]   Creatinine 0.5 BASIC METABOLIC PANEL (8) [[id number]] (Abnormal)  Resulted: 03/20/17 1236, Result status: Final result    Ordering provider: Casey Iyer MD  03/20/17 1118 Resulting lab: [facility]    Specimen Information    Type Source Collected On   Blood  03/20/17 Recommended therapeutic ranges for anticoagulant therapy are   as follows:   2.0 - 3.0 All indications except for mechanical prosthetic   cardiac valves. 2.5 - 3.5 Mechanical prosthetic cardiac valves.               CBC WITH DIFFERENTIAL WITH PLATELET [ ATTENDING PHYSICIAN: Sumanth Agrawal MD   PATIENT ACCOUNT#:   [de-identified]    LOCATION:  [facility]  MEDICAL RECORD #:   [id number]       YOB: 1928  ADMISSION DATE:       03/13/2017    HISTORY AND PHYSICAL EXAMINATION    CHIEF Fernanda Saldana vomiting. She says that her bowel movements are regular. She does have urinary incontinence, chronic lower extremity edema, generalized weakness with a fall about 1 year ago after which she ended up in the nursing home.   Other systems reviewed and negati dysfunction. RV dilated. PA pressure 38 mmHg. Right ventricle moderately to severely dilated with decreased systolic function. Small pericardial effusion. ASSESSMENT AND PLAN:    1.    Acute large PE in the right main pulmonary artery with right ve Consults signed by Jocelyne Cevallos MD at 3/20/2017  3:44 PM      Author:  Jocelyne Cevallos MD Service:  (none) Author Type:  Physician    Filed:  3/20/2017  3:44 PM Note Time:  3/20/2017  2:51 PM Status:  Signed    :  Jocelyne Cevallos MD (Physician) at bedside. He states that she is unable to ambulate and almost completely bed/wheelchair bound at her nursing facility. Her diet is somewhat erratic. She is otherwise without complaints currently.      PAST MEDICAL HISTORY:  Hypothyroidism, osteoporosis emboli, now with mild warfarin-induced coagulopathy while being transitioned from warfarin/heparin to rivaroxaban.     The patient's elevated INR is likely secondary to both warfarin and rivaroxaban, the latter which does provide some prolongation of the PT